# Patient Record
Sex: MALE | Race: WHITE
[De-identification: names, ages, dates, MRNs, and addresses within clinical notes are randomized per-mention and may not be internally consistent; named-entity substitution may affect disease eponyms.]

---

## 2018-03-10 ENCOUNTER — HOSPITAL ENCOUNTER (EMERGENCY)
Dept: HOSPITAL 45 - C.EDB | Age: 28
Discharge: HOME | End: 2018-03-10
Payer: COMMERCIAL

## 2018-03-10 VITALS
HEART RATE: 78 BPM | TEMPERATURE: 98.24 F | SYSTOLIC BLOOD PRESSURE: 151 MMHG | DIASTOLIC BLOOD PRESSURE: 81 MMHG | OXYGEN SATURATION: 97 %

## 2018-03-10 VITALS
BODY MASS INDEX: 31.34 KG/M2 | WEIGHT: 206.79 LBS | HEIGHT: 67.99 IN | BODY MASS INDEX: 31.34 KG/M2 | HEIGHT: 67.99 IN | WEIGHT: 206.79 LBS

## 2018-03-10 DIAGNOSIS — H66.91: Primary | ICD-10-CM

## 2018-03-10 NOTE — EMERGENCY ROOM VISIT NOTE
History


Report prepared by Seema:  Nathanael Witt


Under the Supervision of:  Dr. Luis Reed M.D.


First contact with patient:  08:48


Chief Complaint:  EAR PAIN


Stated Complaint:  LOSS OF HEARING IN R EAR





History of Present Illness


The patient is a 27 year old male who presents to the Emergency Room with 

complaints of constant loss of hearing in his right ear that began 2 days ago. 

Patient has associated symptoms of ear pain. Patient states that he felt like 

his ear "was draining". Patient states he saw his doctor at [facility] recently. 

He states during his visit they performed a hearing test that indicated that he 

was unable to hear out of his right ear. Patient denies any recent sickness or 

URIs. He denies a history of smoking.





   Source of History:  patient


   Onset:  2 days ago


   Position:  ear (right)


   Timing:  constant


   Modifying Factors (Relieving):  other (None)


Note:


Patient has ear pain.





Review of Systems


See HPI for pertinent positives & negatives. A total of 10 systems reviewed and 

were otherwise negative.





Past Medical & Surgical


No pertinent past medical & surgical history.





Family History


No pertinent family history.





Social History


Smoking Status:  Never Smoker





Current/Historical Medications


Scheduled


Amoxicillin (Amoxil), 500 MG PO TID





Allergies


Coded Allergies:  


     No Known Allergies (Unverified , 8/10/15)





Physical Exam


Vital Signs











  Date Time  Temp Pulse Resp B/P (MAP) Pulse Ox O2 Delivery O2 Flow Rate FiO2


 


3/10/18 09:19 36.8 78 18 151/81 97   


 


3/10/18 08:43 36.8 66 20 151/96 97 Room Air  











Physical Exam


GENERAL: Awake, alert, well-appearing, in no acute distress


HENT: Normocephalic, atraumatic. Oropharynx unremarkable.


EYES: Normal conjunctiva. Sclera non-icteric.


EARS: Right TM bulging and injected.


NECK: Supple. No nuchal rigidity. FROM. No JVD.


RESPIRATORY: Clear to auscultation.


CARDIAC: Regular rate, normal rhythm. Extremities warm and well perfused. 

Pulses equal.


ABDOMEN: Soft, non-distended. No tenderness to palpation. No rebound or 

guarding. No masses.


RECTAL: Deferred.


MUSCULOSKELETAL: Chest examination reveals no tenderness. The back is 

symmetrical on inspection without obvious abnormality. There is no CVA 

tenderness to palpation. No joint edema. 


LOWER EXTREMITIES: Calves are equal size bilaterally and non-tender. No edema. 

No discoloration. 


NEURO: Normal sensorium. No sensory or motor deficits noted. 


SKIN: No rash or jaundice noted.





Medical Decision & Procedures


Medications Administered











 Medications


  (Trade)  Dose


 Ordered  Sig/Kameron


 Route  Start Time


 Stop Time Status Last Admin


Dose Admin


 


 Amoxicillin


  (Amoxil Cap)  500 mg  NOW  STAT


 PO  3/10/18 08:54


 3/10/18 08:55 DC 3/10/18 09:12


500 MG











ED Course


0846: Past medical records reviewed. The patient was evaluated in room B10. A 

complete history and physical examination was performed. 





0854: Amoxicillin 500mg PO





0909: Upon reexamination the patient is resting comfortably. I discussed 

results and treatment plan with the patient. He verbalizes agreement and 

understanding. The patient is ready for discharge.





Medical Decision


Differential Diagnosis:


Otitis Media





27-year-old male who presents to the emergency department over complaints that 

he has gradually  lost hearing in his right ear over the past 2 days.  On 

physical examination the patient appears to have an otitis media.  For this 

reason he was placed on amoxicillin.  I stressed the need for follow-up with 

ear nose throat especially if his hearing is not resolving.  Patient has no 

evidence of meningitis or encephalitis on examination and was in agreement with 

treatment plan.





Medication Reconcilliation


Current Medication List:  was personally reviewed by me





Blood Pressure Screening


Patient's blood pressure:  Elevated blood pressure


Blood pressure disposition:  Referred to PCP





Impression





 Primary Impression:  


 Otitis media





Scribe Attestation


The scribe's documentation has been prepared under my direction and personally 

reviewed by me in its entirety. I confirm that the note above accurately 

reflects all work, treatment, procedures, and medical decision making performed 

by me.





Departure Information


Dispostion


Home / Self-Care





Prescriptions





Amoxicillin (AMOXIL) 500 Mg Cap


500 MG PO TID, #30 CAP


   Prov: Luis Reed MD         3/10/18





Referrals


No Doctor, Assigned (PCP)





Forms


HOME CARE DOCUMENTATION FORM,                                                 

               IMPORTANT VISIT INFORMATION, WORK / SCHOOL INSTRUCTIONS





Patient Instructions


ED Otitis Media Serous Adult, My [facility]





Additional Instructions





Follow up with Dr Lowe's office











You have been examined and treated today on an emergency basis only. This is 

not a substitute for, or an effort to provide, complete comprehensive medical 

care. It is impossible to recognize and treat all injuries or illnesses in a 

single emergency department visit. It is therefore important that you follow up 

closely with your PCP.  Call as soon as possible for an appointment.  





Thank you for your time and consideration.  I look forward to speaking with you 

again soon.  Please don't hesitate to call us if you have any questions.





Problem Qualifiers








 Primary Impression:  


 Otitis media


 Otitis media type:  unspecified  Chronicity:  acute  Qualified Codes:  H66.90 

- Otitis media, unspecified, unspecified ear